# Patient Record
Sex: FEMALE | Race: WHITE | NOT HISPANIC OR LATINO | Employment: OTHER | URBAN - NONMETROPOLITAN AREA
[De-identification: names, ages, dates, MRNs, and addresses within clinical notes are randomized per-mention and may not be internally consistent; named-entity substitution may affect disease eponyms.]

---

## 2017-01-03 RX ORDER — SERTRALINE HYDROCHLORIDE 25 MG/1
TABLET, FILM COATED ORAL
Qty: 30 TAB | Refills: 3 | Status: SHIPPED | OUTPATIENT
Start: 2017-01-03

## 2017-01-04 ENCOUNTER — TELEPHONE (OUTPATIENT)
Dept: MEDICAL GROUP | Facility: PHYSICIAN GROUP | Age: 67
End: 2017-01-04

## 2017-01-04 NOTE — TELEPHONE ENCOUNTER
1. Caller Name: Kary                             Call Back Number: [phone] (home)       Patient approves a detailed voicemail message: N\A    Kary left message requesting refill Zoloft, returned call and informed RX approved yesterday, Rhea Medina

## 2017-01-05 ENCOUNTER — TELEPHONE (OUTPATIENT)
Dept: MEDICAL GROUP | Facility: PHYSICIAN GROUP | Age: 67
End: 2017-01-05

## 2017-01-05 NOTE — TELEPHONE ENCOUNTER
"Please call patient:  I have a form from [facility] and she had high lighted a \"immune compromised problem\" and requesting certification. Can she follow up to discuss, or has she done this paper work before?  Gloria Ga    "

## 2017-01-06 NOTE — TELEPHONE ENCOUNTER
Spoke with Kary, they will be leaving this week-end for awhile. They are moving to [facility]. This paperwork has never been filled out. Will follow up when she returns.

## 2017-03-06 ENCOUNTER — TELEPHONE (OUTPATIENT)
Dept: MEDICAL GROUP | Facility: PHYSICIAN GROUP | Age: 67
End: 2017-03-06

## 2017-03-07 NOTE — TELEPHONE ENCOUNTER
Patient has not been in since June 2016. I really would like her to come in to fill out forms. Is this the first time for DMV?  Gloria Ga

## 2017-03-07 NOTE — TELEPHONE ENCOUNTER
1. Caller Name: Kary                         Call Back Number: [phone] (home)       Patient approves a detailed voicemail message: guillermo Preston asking if Gloria Ga will be willing to fill out DMV paperwork regarding [employer]

## 2017-03-10 RX ORDER — SIMVASTATIN 20 MG
TABLET ORAL
Qty: 30 TAB | Refills: 1 | Status: SHIPPED | OUTPATIENT
Start: 2017-03-10

## 2017-04-11 RX ORDER — INSULIN LISPRO 100 [IU]/ML
INJECTION, SUSPENSION SUBCUTANEOUS
Qty: 60 ML | Refills: 0 | Status: SHIPPED | OUTPATIENT
Start: 2017-04-11

## 2017-04-11 NOTE — TELEPHONE ENCOUNTER
Was the patient seen in the last year in this department? Yes   6/2016    Does patient have an active prescription for medications requested? No     Received Request Via: Pharmacy

## 2017-04-17 DIAGNOSIS — F41.8 DEPRESSION WITH ANXIETY: ICD-10-CM

## 2017-05-26 ENCOUNTER — PATIENT OUTREACH (OUTPATIENT)
Dept: HEALTH INFORMATION MANAGEMENT | Facility: OTHER | Age: 67
End: 2017-05-26

## 2017-05-26 NOTE — PROGRESS NOTES
Outbound call to patient for medication reconciliation. Patient has relocated to [place] and is changing plans to [facility]. Updated allergy and medication lists.    Patient demonstrates adherence to medication schedule and understanding of indications for medications.    Patient not currently taking any meds that meet Beer's criteria for potentially inappropriate use in patients over 65.    Patient denies any side effects from medications.    Patient's diabetes is poorly controlled.  She states she could not feel her legs and was wheelchair bound due to neuropathy.  States pain has improved with gabapentin use.  Last A1c was 9.4. Recommend patient call Angela to inquire about disease management.     Patient is taking Humalog mix 75/25 and metformin. Patient does not take BS reading regularly.  Asked patient to start testing at least FBS to have values to take to her new doctor.  Patient seems to have little motivation to monitor and control blood sugar.  Discussed progression of DM and potential damage that high blood sugars can cause.     Patient does have occassional hypoglycemic events, however she can recognize s/sx and knows how to treat.    Patient's BP is between 140-145/80-90. Asked patient to discuss BP with provider at next office visit. In the mean time, recommended the DASH diet, decrease carb intake, and increase in exercise if possible.      Patient can afford medications.    Patient does not smoke tobacco.    Patient has pneumonia vaccinations up to date, does not get her flu shot annually.  Discussed benefits of flu vaccination with patient.    Plan:    Patient to find Care Coordinator for disease management.  Patient needs assistance controlling DM.

## 2017-12-13 ENCOUNTER — PATIENT OUTREACH (OUTPATIENT)
Dept: HEALTH INFORMATION MANAGEMENT | Facility: OTHER | Age: 67
End: 2017-12-13